# Patient Record
Sex: FEMALE | Race: WHITE | URBAN - METROPOLITAN AREA
[De-identification: names, ages, dates, MRNs, and addresses within clinical notes are randomized per-mention and may not be internally consistent; named-entity substitution may affect disease eponyms.]

---

## 2023-06-20 ENCOUNTER — HOSPITAL ENCOUNTER (OUTPATIENT)
Age: 53
Setting detail: SPECIMEN
Discharge: HOME | End: 2023-06-20

## 2023-06-20 LAB
BUN SERPL-MCNC: 20 MG/DL (ref 6–20)
CREAT SERPL-MCNC: 0.99 MG/DL (ref 0.5–0.9)
GFR SERPL CREATININE-BSD FRML MDRD: >60 ML/MIN/1.73M2

## 2024-05-07 VITALS
WEIGHT: 154 LBS | SYSTOLIC BLOOD PRESSURE: 121 MMHG | DIASTOLIC BLOOD PRESSURE: 80 MMHG | HEART RATE: 65 BPM | BODY MASS INDEX: 26.29 KG/M2 | HEIGHT: 64 IN

## 2024-05-07 DIAGNOSIS — N95.9 MENOPAUSAL AND PERIMENOPAUSAL DISORDER: Primary | ICD-10-CM

## 2024-05-07 PROBLEM — Z91.018 ALLERGY TO FOOD: Status: ACTIVE | Noted: 2024-05-07

## 2024-05-07 RX ORDER — LORATADINE 10 MG/1
10 TABLET ORAL DAILY
COMMUNITY

## 2024-05-08 ENCOUNTER — OFFICE VISIT (OUTPATIENT)
Age: 54
End: 2024-05-08
Payer: COMMERCIAL

## 2024-05-08 ENCOUNTER — TELEPHONE (OUTPATIENT)
Age: 54
End: 2024-05-08

## 2024-05-08 VITALS
TEMPERATURE: 98.2 F | OXYGEN SATURATION: 100 % | BODY MASS INDEX: 25.33 KG/M2 | HEIGHT: 64 IN | HEART RATE: 90 BPM | RESPIRATION RATE: 16 BRPM | SYSTOLIC BLOOD PRESSURE: 118 MMHG | WEIGHT: 148.4 LBS | DIASTOLIC BLOOD PRESSURE: 72 MMHG

## 2024-05-08 DIAGNOSIS — Z01.419 VISIT FOR GYNECOLOGIC EXAMINATION: ICD-10-CM

## 2024-05-08 DIAGNOSIS — Z00.00 ENCOUNTER FOR WELL ADULT EXAM WITHOUT ABNORMAL FINDINGS: Primary | ICD-10-CM

## 2024-05-08 DIAGNOSIS — L70.9 ACNE, UNSPECIFIED ACNE TYPE: ICD-10-CM

## 2024-05-08 DIAGNOSIS — Z12.31 SCREENING MAMMOGRAM FOR BREAST CANCER: ICD-10-CM

## 2024-05-08 DIAGNOSIS — J30.1 SEASONAL ALLERGIC RHINITIS DUE TO POLLEN: ICD-10-CM

## 2024-05-08 DIAGNOSIS — K59.01 SLOW TRANSIT CONSTIPATION: ICD-10-CM

## 2024-05-08 PROCEDURE — 99396 PREV VISIT EST AGE 40-64: CPT | Performed by: FAMILY MEDICINE

## 2024-05-08 RX ORDER — TRETINOIN 0.5 MG/G
CREAM TOPICAL
Qty: 45 G | Refills: 0 | Status: SHIPPED | OUTPATIENT
Start: 2024-05-08 | End: 2024-06-07

## 2024-05-08 RX ORDER — CLINDAMYCIN PHOSPHATE 10 MG/G
GEL TOPICAL
Status: CANCELLED | OUTPATIENT
Start: 2024-05-08 | End: 2024-05-15

## 2024-05-08 RX ORDER — CLINDAMYCIN AND BENZOYL PEROXIDE 10; 50 MG/G; MG/G
GEL TOPICAL
Qty: 50 G | Refills: 1 | Status: SHIPPED | OUTPATIENT
Start: 2024-05-08

## 2024-05-08 SDOH — ECONOMIC STABILITY: FOOD INSECURITY: WITHIN THE PAST 12 MONTHS, THE FOOD YOU BOUGHT JUST DIDN'T LAST AND YOU DIDN'T HAVE MONEY TO GET MORE.: NEVER TRUE

## 2024-05-08 SDOH — ECONOMIC STABILITY: FOOD INSECURITY: WITHIN THE PAST 12 MONTHS, YOU WORRIED THAT YOUR FOOD WOULD RUN OUT BEFORE YOU GOT MONEY TO BUY MORE.: NEVER TRUE

## 2024-05-08 SDOH — ECONOMIC STABILITY: INCOME INSECURITY: HOW HARD IS IT FOR YOU TO PAY FOR THE VERY BASICS LIKE FOOD, HOUSING, MEDICAL CARE, AND HEATING?: NOT HARD AT ALL

## 2024-05-08 SDOH — ECONOMIC STABILITY: HOUSING INSECURITY
IN THE LAST 12 MONTHS, WAS THERE A TIME WHEN YOU DID NOT HAVE A STEADY PLACE TO SLEEP OR SLEPT IN A SHELTER (INCLUDING NOW)?: NO

## 2024-05-08 ASSESSMENT — PATIENT HEALTH QUESTIONNAIRE - PHQ9
SUM OF ALL RESPONSES TO PHQ QUESTIONS 1-9: 0
SUM OF ALL RESPONSES TO PHQ QUESTIONS 1-9: 0
1. LITTLE INTEREST OR PLEASURE IN DOING THINGS: NOT AT ALL
2. FEELING DOWN, DEPRESSED OR HOPELESS: NOT AT ALL
SUM OF ALL RESPONSES TO PHQ QUESTIONS 1-9: 0
SUM OF ALL RESPONSES TO PHQ9 QUESTIONS 1 & 2: 0
SUM OF ALL RESPONSES TO PHQ QUESTIONS 1-9: 0

## 2024-05-08 NOTE — TELEPHONE ENCOUNTER
Patient may have to call and see if she can find when she had the tdap. I could not find anything for a [facility]..

## 2024-05-08 NOTE — PROGRESS NOTES
[facility]  [facility]  [address]  [place] [address]     Date of Visit:  2024  Patient Name: Olesya Gil   Patient :  1970     CHIEF COMPLAINT/HPI:     Chief Complaint   Patient presents with    Annual Exam     Patient is here for yearly exam, no labs for today  Patient would like a referral for a gynecologist           HPI      Olesya Gil, 53 y.o. presents today for well adult exam.     She has been feeling well and has a good energy level. She's noticed improvement in pain, especially in her knees. She's made dietary changes and has eliminated her morning bagel with peanut butter, rice, and pasta. She has pasta on her cheat days and has been consuming wheat instead of white. She has cut back from 3 glasses of alcohol per week to 2 glass per week or every other week. She's happy to share her weight has decreased from 160 to 146.     She painted her hallway and bedroom over the weekend and has had headaches at the top of her head for the last 3 days. She's also having neck pain. She's treated pain with Excedrin with most recent use being last night. She had pressure over her sinuses, nasal congestion and rhinorrhea last week but that has improved. She's taken Claritin.     She denies headaches, dizziness, syncope, chest pain, shortness of breath, nausea, vomiting, or diarrhea. She continues to experience constipation which she treats with Benefiber and papaya. She is working on increasing her fluid intake and has been drinking decaf coffee and incorporating more ice in her smoothies. She denies blood in her stool, edema, vision changes, fever, or chills.    She is working on scheduling with a dermatologist and gyn in [facility].     She had TDAP 5 years ago at [facility] in [place], [place]. She does not receive annual flu vaccines as she states they make her very sick. She had a COVID immunization in 2020.     Her parents are living and in good health.     Copied from

## 2024-05-10 NOTE — TELEPHONE ENCOUNTER
[facility] in [place] changed to [facility]. Not sure where those records went. Most likely unable to find those.    I called the patient and she did say it was the year she got engaged  and that would of been 8 years ago.

## 2024-05-10 NOTE — TELEPHONE ENCOUNTER
Did we call [facility] in [place]?  She said she had in ER  It won't be in [place] database

## 2024-05-14 ENCOUNTER — HOSPITAL ENCOUNTER (OUTPATIENT)
Age: 54
Setting detail: SPECIMEN
Discharge: HOME | End: 2024-05-14

## 2024-05-14 DIAGNOSIS — Z00.00 ENCOUNTER FOR WELL ADULT EXAM WITHOUT ABNORMAL FINDINGS: ICD-10-CM

## 2024-05-14 LAB
ALBUMIN SERPL-MCNC: 4.6 G/DL (ref 3.5–5.2)
ALBUMIN/GLOB SERPL: 2 {RATIO} (ref 1–2.5)
ALP SERPL-CCNC: 63 U/L (ref 35–104)
ALT SERPL-CCNC: 28 U/L (ref 10–35)
ANION GAP SERPL CALCULATED.3IONS-SCNC: 10 MMOL/L (ref 9–16)
AST SERPL-CCNC: 32 U/L (ref 10–35)
BASOPHILS # BLD: 0.04 K/UL (ref 0–0.2)
BASOPHILS NFR BLD: 1 % (ref 0–2)
BILIRUB SERPL-MCNC: 0.7 MG/DL (ref 0–1.2)
BUN SERPL-MCNC: 19 MG/DL (ref 6–20)
CALCIUM SERPL-MCNC: 9.7 MG/DL (ref 8.6–10.4)
CHLORIDE SERPL-SCNC: 103 MMOL/L (ref 98–107)
CHOLEST SERPL-MCNC: 184 MG/DL (ref 0–199)
CHOLESTEROL/HDL RATIO: 2
CO2 SERPL-SCNC: 25 MMOL/L (ref 20–31)
CREAT SERPL-MCNC: 1.2 MG/DL (ref 0.5–0.9)
EOSINOPHIL # BLD: 0.14 K/UL (ref 0–0.44)
EOSINOPHILS RELATIVE PERCENT: 3 % (ref 1–4)
ERYTHROCYTE [DISTWIDTH] IN BLOOD BY AUTOMATED COUNT: 13.3 % (ref 11.8–14.4)
GFR, ESTIMATED: 58 ML/MIN/1.73M2
GLUCOSE SERPL-MCNC: 90 MG/DL (ref 74–99)
HCT VFR BLD AUTO: 40.8 % (ref 36.3–47.1)
HDLC SERPL-MCNC: 74 MG/DL
HGB BLD-MCNC: 13.4 G/DL (ref 11.9–15.1)
IMM GRANULOCYTES # BLD AUTO: <0.03 K/UL (ref 0–0.3)
IMM GRANULOCYTES NFR BLD: 0 %
LDLC SERPL CALC-MCNC: 95 MG/DL (ref 0–100)
LYMPHOCYTES NFR BLD: 1.91 K/UL (ref 1.1–3.7)
LYMPHOCYTES RELATIVE PERCENT: 40 % (ref 24–43)
MCH RBC QN AUTO: 28.8 PG (ref 25.2–33.5)
MCHC RBC AUTO-ENTMCNC: 32.8 G/DL (ref 28.4–34.8)
MCV RBC AUTO: 87.6 FL (ref 82.6–102.9)
MONOCYTES NFR BLD: 0.32 K/UL (ref 0.1–1.2)
MONOCYTES NFR BLD: 7 % (ref 3–12)
NEUTROPHILS NFR BLD: 49 % (ref 36–65)
NEUTS SEG NFR BLD: 2.39 K/UL (ref 1.5–8.1)
NRBC BLD-RTO: 0 PER 100 WBC
PLATELET # BLD AUTO: 164 K/UL (ref 138–453)
PMV BLD AUTO: 11.2 FL (ref 8.1–13.5)
POTASSIUM SERPL-SCNC: 4.5 MMOL/L (ref 3.7–5.3)
PROT SERPL-MCNC: 7.4 G/DL (ref 6.6–8.7)
RBC # BLD AUTO: 4.66 M/UL (ref 3.95–5.11)
SODIUM SERPL-SCNC: 138 MMOL/L (ref 136–145)
T4 FREE SERPL-MCNC: 1.2 NG/DL (ref 0.92–1.68)
TRIGL SERPL-MCNC: 72 MG/DL
TSH SERPL DL<=0.05 MIU/L-ACNC: 2.3 UIU/ML (ref 0.27–4.2)
VLDLC SERPL CALC-MCNC: 14 MG/DL
WBC OTHER # BLD: 4.8 K/UL (ref 3.5–11.3)

## 2024-06-05 ENCOUNTER — NURSE ONLY (OUTPATIENT)
Age: 54
End: 2024-06-05
Payer: COMMERCIAL

## 2024-06-05 DIAGNOSIS — Z23 ENCOUNTER FOR IMMUNIZATION: Primary | ICD-10-CM

## 2024-06-05 PROCEDURE — 90471 IMMUNIZATION ADMIN: CPT | Performed by: FAMILY MEDICINE

## 2024-06-05 PROCEDURE — 90715 TDAP VACCINE 7 YRS/> IM: CPT | Performed by: FAMILY MEDICINE

## 2024-06-05 NOTE — PROGRESS NOTES
tdap was given to patient for immunization update. Tolerated well. Approved by Dr. Junior. Injection was given left delt--pt denied VIS

## 2025-05-05 SDOH — ECONOMIC STABILITY: FOOD INSECURITY: WITHIN THE PAST 12 MONTHS, THE FOOD YOU BOUGHT JUST DIDN'T LAST AND YOU DIDN'T HAVE MONEY TO GET MORE.: NEVER TRUE

## 2025-05-05 SDOH — ECONOMIC STABILITY: INCOME INSECURITY: IN THE LAST 12 MONTHS, WAS THERE A TIME WHEN YOU WERE NOT ABLE TO PAY THE MORTGAGE OR RENT ON TIME?: NO

## 2025-05-05 SDOH — ECONOMIC STABILITY: FOOD INSECURITY: WITHIN THE PAST 12 MONTHS, YOU WORRIED THAT YOUR FOOD WOULD RUN OUT BEFORE YOU GOT MONEY TO BUY MORE.: NEVER TRUE

## 2025-05-05 SDOH — ECONOMIC STABILITY: TRANSPORTATION INSECURITY
IN THE PAST 12 MONTHS, HAS LACK OF TRANSPORTATION KEPT YOU FROM MEETINGS, WORK, OR FROM GETTING THINGS NEEDED FOR DAILY LIVING?: NO

## 2025-05-05 SDOH — ECONOMIC STABILITY: TRANSPORTATION INSECURITY
IN THE PAST 12 MONTHS, HAS THE LACK OF TRANSPORTATION KEPT YOU FROM MEDICAL APPOINTMENTS OR FROM GETTING MEDICATIONS?: NO

## 2025-05-07 NOTE — PATIENT INSTRUCTIONS
Olesya    Thank you for choosing [facility].  We know you have options when it comes to your healthcare; we appreciate that you chose us. Our goal is to provide exceptional  service and world class care to every patient.  You will be receiving a survey via email or text message asking for your feedback.  Please take a few minutes to share your thoughts about your recent visit. Your comments help us understand what we do well and ways we can improve.  Thank you in advance for your valuable feedback.      Dr. Mumtaz Brady, CMA

## 2025-05-08 ENCOUNTER — OFFICE VISIT (OUTPATIENT)
Age: 55
End: 2025-05-08
Payer: COMMERCIAL

## 2025-05-08 VITALS
WEIGHT: 138.4 LBS | TEMPERATURE: 98.3 F | HEART RATE: 64 BPM | SYSTOLIC BLOOD PRESSURE: 122 MMHG | DIASTOLIC BLOOD PRESSURE: 60 MMHG | BODY MASS INDEX: 23.76 KG/M2 | OXYGEN SATURATION: 99 %

## 2025-05-08 DIAGNOSIS — Z00.00 ENCOUNTER FOR WELL ADULT EXAM WITHOUT ABNORMAL FINDINGS: Primary | ICD-10-CM

## 2025-05-08 DIAGNOSIS — Z12.31 SCREENING MAMMOGRAM FOR BREAST CANCER: ICD-10-CM

## 2025-05-08 PROCEDURE — 99396 PREV VISIT EST AGE 40-64: CPT | Performed by: FAMILY MEDICINE

## 2025-05-08 RX ORDER — DIMENHYDRINATE 50 MG
TABLET ORAL
COMMUNITY
Start: 2024-08-06

## 2025-05-08 RX ORDER — TRIAMCINOLONE ACETONIDE 55 UG/1
2 SPRAY, METERED NASAL
COMMUNITY
Start: 2024-08-06

## 2025-05-08 RX ORDER — WHEAT DEXTRIN/ASPARTAME 3 G/6 G
POWDER IN PACKET (EA) ORAL
COMMUNITY
Start: 2024-08-06

## 2025-05-08 RX ORDER — GINSENG 100 MG
50 CAPSULE ORAL PRN
COMMUNITY
Start: 2024-08-06

## 2025-05-08 ASSESSMENT — PATIENT HEALTH QUESTIONNAIRE - PHQ9
SUM OF ALL RESPONSES TO PHQ QUESTIONS 1-9: 0
2. FEELING DOWN, DEPRESSED OR HOPELESS: NOT AT ALL
SUM OF ALL RESPONSES TO PHQ QUESTIONS 1-9: 0
SUM OF ALL RESPONSES TO PHQ QUESTIONS 1-9: 0
1. LITTLE INTEREST OR PLEASURE IN DOING THINGS: NOT AT ALL
SUM OF ALL RESPONSES TO PHQ QUESTIONS 1-9: 0

## 2025-05-08 NOTE — PROGRESS NOTES
Cholesterol, Total 05/14/2024 184     HDL 05/14/2024 74     LDL Cholesterol 05/14/2024 95     Chol/HDL Ratio 05/14/2024 2.0     Triglycerides 05/14/2024 72     VLDL 05/14/2024 14     Sodium 05/14/2024 138     Potassium 05/14/2024 4.5     Chloride 05/14/2024 103     CO2 05/14/2024 25     Anion Gap 05/14/2024 10     Glucose 05/14/2024 90     BUN 05/14/2024 19     Creatinine 05/14/2024 1.2 (H)     Est, Glom Filt Rate 05/14/2024 58 (L)     Calcium 05/14/2024 9.7     Total Protein 05/14/2024 7.4     Albumin 05/14/2024 4.6     Albumin/Globulin Ratio 05/14/2024 2.0     Total Bilirubin 05/14/2024 0.7     Alkaline Phosphatase 05/14/2024 63     ALT 05/14/2024 28     AST 05/14/2024 32     WBC 05/14/2024 4.8     RBC 05/14/2024 4.66     Hemoglobin 05/14/2024 13.4     Hematocrit 05/14/2024 40.8     MCV 05/14/2024 87.6     MCH 05/14/2024 28.8     MCHC 05/14/2024 32.8     RDW 05/14/2024 13.3     Platelets 05/14/2024 164     MPV 05/14/2024 11.2     NRBC Automated 05/14/2024 0.0     Neutrophils % 05/14/2024 49     Lymphocytes % 05/14/2024 40     Monocytes % 05/14/2024 7     Eosinophils % 05/14/2024 3     Basophils % 05/14/2024 1     Immature Granulocytes % 05/14/2024 0     Neutrophils Absolute 05/14/2024 2.39     Lymphocytes Absolute 05/14/2024 1.91     Monocytes Absolute 05/14/2024 0.32     Eosinophils Absolute 05/14/2024 0.14     Basophils Absolute 05/14/2024 0.04     Immature Granulocytes Ab* 05/14/2024 <0.03         ASSESSMENT/PLAN     1. Encounter for well adult exam without abnormal findings  -     CBC with Auto Differential; Future  -     Comprehensive Metabolic Panel; Future  -     Lipid Panel; Future  -     TSH; Future  -     T4, Free; Future  -     Vitamin D 25 Hydroxy; Future  -     Rubeola Antibody IgG; Future  -     Varicella Zoster Antibody, IgG; Future  2. Screening mammogram for breast cancer  -     JUSTICE DIGITAL SCREEN W OR WO CAD BILATERAL; Future     Continue healthy living, diet and exercise  Seeing GYN and eye

## 2025-05-09 ENCOUNTER — HOSPITAL ENCOUNTER (OUTPATIENT)
Age: 55
Setting detail: SPECIMEN
Discharge: HOME | End: 2025-05-09

## 2025-05-09 DIAGNOSIS — Z00.00 ENCOUNTER FOR WELL ADULT EXAM WITHOUT ABNORMAL FINDINGS: ICD-10-CM

## 2025-05-09 LAB
25(OH)D3 SERPL-MCNC: 20.9 NG/ML (ref 30–100)
ALBUMIN SERPL-MCNC: 4.3 G/DL (ref 3.5–5.2)
ALBUMIN/GLOB SERPL: 1.4 {RATIO} (ref 1–2.5)
ALP SERPL-CCNC: 76 U/L (ref 35–104)
ALT SERPL-CCNC: 25 U/L (ref 10–35)
ANION GAP SERPL CALCULATED.3IONS-SCNC: 9 MMOL/L (ref 9–16)
AST SERPL-CCNC: 23 U/L (ref 10–35)
BASOPHILS # BLD: 0.03 K/UL (ref 0–0.2)
BASOPHILS NFR BLD: 1 % (ref 0–2)
BILIRUB SERPL-MCNC: 0.4 MG/DL (ref 0–1.2)
BUN SERPL-MCNC: 15 MG/DL (ref 6–20)
CALCIUM SERPL-MCNC: 9.6 MG/DL (ref 8.6–10.4)
CHLORIDE SERPL-SCNC: 104 MMOL/L (ref 98–107)
CHOLEST SERPL-MCNC: 191 MG/DL (ref 0–199)
CHOLESTEROL/HDL RATIO: 2.4
CO2 SERPL-SCNC: 27 MMOL/L (ref 20–31)
CREAT SERPL-MCNC: 1 MG/DL (ref 0.6–0.9)
EOSINOPHIL # BLD: 0.15 K/UL (ref 0–0.44)
EOSINOPHILS RELATIVE PERCENT: 3 % (ref 1–4)
ERYTHROCYTE [DISTWIDTH] IN BLOOD BY AUTOMATED COUNT: 13.2 % (ref 11.8–14.4)
GFR, ESTIMATED: 68 ML/MIN/1.73M2
GLUCOSE SERPL-MCNC: 95 MG/DL (ref 74–99)
HCT VFR BLD AUTO: 39.7 % (ref 36.3–47.1)
HDLC SERPL-MCNC: 79 MG/DL
HGB BLD-MCNC: 12.9 G/DL (ref 11.9–15.1)
IMM GRANULOCYTES # BLD AUTO: <0.03 K/UL (ref 0–0.3)
IMM GRANULOCYTES NFR BLD: 0 %
LDLC SERPL CALC-MCNC: 97 MG/DL (ref 0–100)
LYMPHOCYTES NFR BLD: 1.73 K/UL (ref 1.1–3.7)
LYMPHOCYTES RELATIVE PERCENT: 36 % (ref 24–43)
MCH RBC QN AUTO: 28.8 PG (ref 25.2–33.5)
MCHC RBC AUTO-ENTMCNC: 32.5 G/DL (ref 28.4–34.8)
MCV RBC AUTO: 88.6 FL (ref 82.6–102.9)
MONOCYTES NFR BLD: 0.36 K/UL (ref 0.1–1.2)
MONOCYTES NFR BLD: 7 % (ref 3–12)
NEUTROPHILS NFR BLD: 53 % (ref 36–65)
NEUTS SEG NFR BLD: 2.56 K/UL (ref 1.5–8.1)
NRBC BLD-RTO: 0 PER 100 WBC
PLATELET # BLD AUTO: 172 K/UL (ref 138–453)
PMV BLD AUTO: 11 FL (ref 8.1–13.5)
POTASSIUM SERPL-SCNC: 4.8 MMOL/L (ref 3.7–5.3)
PROT SERPL-MCNC: 7.4 G/DL (ref 6.6–8.7)
RBC # BLD AUTO: 4.48 M/UL (ref 3.95–5.11)
SODIUM SERPL-SCNC: 140 MMOL/L (ref 136–145)
T4 FREE SERPL-MCNC: 1.2 NG/DL (ref 0.92–1.68)
TRIGL SERPL-MCNC: 74 MG/DL
TSH SERPL DL<=0.05 MIU/L-ACNC: 2.52 UIU/ML (ref 0.27–4.2)
VLDLC SERPL CALC-MCNC: 15 MG/DL (ref 1–30)
WBC OTHER # BLD: 4.8 K/UL (ref 3.5–11.3)

## 2025-05-12 ENCOUNTER — RESULTS FOLLOW-UP (OUTPATIENT)
Age: 55
End: 2025-05-12

## 2025-05-12 LAB
MEV IGG SER-ACNC: 4.06
VZV IGG SER QL IA: 2.81

## 2025-06-05 ENCOUNTER — HOSPITAL ENCOUNTER (OUTPATIENT)
Dept: MAMMOGRAPHY | Age: 55
Discharge: HOME | End: 2025-06-07
Attending: FAMILY MEDICINE
Payer: COMMERCIAL

## 2025-06-05 VITALS — WEIGHT: 138 LBS | BODY MASS INDEX: 23.69 KG/M2

## 2025-06-05 DIAGNOSIS — Z12.31 SCREENING MAMMOGRAM FOR BREAST CANCER: ICD-10-CM

## 2025-06-05 PROCEDURE — 77063 BREAST TOMOSYNTHESIS BI: CPT
